# Patient Record
Sex: MALE | Race: WHITE | NOT HISPANIC OR LATINO | Employment: OTHER | URBAN - METROPOLITAN AREA
[De-identification: names, ages, dates, MRNs, and addresses within clinical notes are randomized per-mention and may not be internally consistent; named-entity substitution may affect disease eponyms.]

---

## 2023-04-11 ENCOUNTER — HOSPITAL ENCOUNTER (OUTPATIENT)
Dept: RADIOLOGY | Facility: MEDICAL CENTER | Age: 73
End: 2023-04-11
Attending: SURGERY
Payer: MEDICARE

## 2023-04-11 DIAGNOSIS — E21.0 PRIMARY HYPERPARATHYROIDISM (HCC): ICD-10-CM

## 2023-04-11 PROCEDURE — 76536 US EXAM OF HEAD AND NECK: CPT

## 2023-04-11 PROCEDURE — 77080 DXA BONE DENSITY AXIAL: CPT

## 2023-04-24 ENCOUNTER — APPOINTMENT (OUTPATIENT)
Dept: ADMISSIONS | Facility: MEDICAL CENTER | Age: 73
End: 2023-04-24
Attending: SURGERY
Payer: MEDICARE

## 2023-05-01 ENCOUNTER — PRE-ADMISSION TESTING (OUTPATIENT)
Dept: ADMISSIONS | Facility: MEDICAL CENTER | Age: 73
End: 2023-05-01
Attending: SURGERY
Payer: MEDICARE

## 2023-05-01 DIAGNOSIS — Z01.812 PRE-PROCEDURAL LABORATORY EXAMINATION: ICD-10-CM

## 2023-05-01 DIAGNOSIS — Z01.810 PRE-PROCEDURAL CARDIOVASCULAR EXAMINATION: ICD-10-CM

## 2023-05-01 LAB
ANION GAP SERPL CALC-SCNC: 12 MMOL/L (ref 7–16)
BUN SERPL-MCNC: 34 MG/DL (ref 8–22)
CALCIUM SERPL-MCNC: 10.4 MG/DL (ref 8.5–10.5)
CHLORIDE SERPL-SCNC: 102 MMOL/L (ref 96–112)
CO2 SERPL-SCNC: 19 MMOL/L (ref 20–33)
CREAT SERPL-MCNC: 1.35 MG/DL (ref 0.5–1.4)
EKG IMPRESSION: NORMAL
ERYTHROCYTE [DISTWIDTH] IN BLOOD BY AUTOMATED COUNT: 43.1 FL (ref 35.9–50)
GFR SERPLBLD CREATININE-BSD FMLA CKD-EPI: 55 ML/MIN/1.73 M 2
GLUCOSE SERPL-MCNC: 92 MG/DL (ref 65–99)
HCT VFR BLD AUTO: 47.9 % (ref 42–52)
HGB BLD-MCNC: 16.1 G/DL (ref 14–18)
MCH RBC QN AUTO: 31.1 PG (ref 27–33)
MCHC RBC AUTO-ENTMCNC: 33.6 G/DL (ref 33.7–35.3)
MCV RBC AUTO: 92.5 FL (ref 81.4–97.8)
PLATELET # BLD AUTO: 242 K/UL (ref 164–446)
PMV BLD AUTO: 10 FL (ref 9–12.9)
POTASSIUM SERPL-SCNC: 4.2 MMOL/L (ref 3.6–5.5)
RBC # BLD AUTO: 5.18 M/UL (ref 4.7–6.1)
SODIUM SERPL-SCNC: 133 MMOL/L (ref 135–145)
WBC # BLD AUTO: 8.4 K/UL (ref 4.8–10.8)

## 2023-05-01 PROCEDURE — 85027 COMPLETE CBC AUTOMATED: CPT

## 2023-05-01 PROCEDURE — 80048 BASIC METABOLIC PNL TOTAL CA: CPT

## 2023-05-01 PROCEDURE — 93010 ELECTROCARDIOGRAM REPORT: CPT | Performed by: INTERNAL MEDICINE

## 2023-05-01 PROCEDURE — 36415 COLL VENOUS BLD VENIPUNCTURE: CPT

## 2023-05-01 PROCEDURE — 93005 ELECTROCARDIOGRAM TRACING: CPT

## 2023-05-01 RX ORDER — METOPROLOL SUCCINATE 50 MG/1
TABLET, EXTENDED RELEASE ORAL
COMMUNITY
Start: 2023-02-05

## 2023-05-01 RX ORDER — LEVOTHYROXINE SODIUM 88 UG/1
TABLET ORAL
COMMUNITY
Start: 2023-02-05

## 2023-05-01 RX ORDER — LISINOPRIL AND HYDROCHLOROTHIAZIDE 25; 20 MG/1; MG/1
TABLET ORAL
COMMUNITY
Start: 2023-02-05

## 2023-05-01 RX ORDER — ATORVASTATIN CALCIUM 20 MG/1
TABLET, FILM COATED ORAL
COMMUNITY
Start: 2023-02-05

## 2023-05-03 ENCOUNTER — ANESTHESIA (OUTPATIENT)
Dept: SURGERY | Facility: MEDICAL CENTER | Age: 73
End: 2023-05-03
Payer: MEDICARE

## 2023-05-03 ENCOUNTER — ANESTHESIA EVENT (OUTPATIENT)
Dept: SURGERY | Facility: MEDICAL CENTER | Age: 73
End: 2023-05-03
Payer: MEDICARE

## 2023-05-03 ENCOUNTER — HOSPITAL ENCOUNTER (OUTPATIENT)
Facility: MEDICAL CENTER | Age: 73
End: 2023-05-03
Attending: SURGERY | Admitting: SURGERY
Payer: MEDICARE

## 2023-05-03 VITALS
RESPIRATION RATE: 18 BRPM | OXYGEN SATURATION: 91 % | HEIGHT: 70 IN | WEIGHT: 232.37 LBS | TEMPERATURE: 97.1 F | HEART RATE: 75 BPM | SYSTOLIC BLOOD PRESSURE: 128 MMHG | BODY MASS INDEX: 33.27 KG/M2 | DIASTOLIC BLOOD PRESSURE: 56 MMHG

## 2023-05-03 LAB — PATHOLOGY CONSULT NOTE: NORMAL

## 2023-05-03 PROCEDURE — 36620 INSERTION CATHETER ARTERY: CPT | Performed by: ANESTHESIOLOGY

## 2023-05-03 PROCEDURE — 88305 TISSUE EXAM BY PATHOLOGIST: CPT

## 2023-05-03 PROCEDURE — 700111 HCHG RX REV CODE 636 W/ 250 OVERRIDE (IP): Performed by: ANESTHESIOLOGY

## 2023-05-03 PROCEDURE — C1751 CATH, INF, PER/CENT/MIDLINE: HCPCS | Performed by: SURGERY

## 2023-05-03 PROCEDURE — 88331 PATH CONSLTJ SURG 1 BLK 1SPC: CPT

## 2023-05-03 PROCEDURE — 00320 ANES ALL PX NECK NOS 1YR/>: CPT | Performed by: ANESTHESIOLOGY

## 2023-05-03 PROCEDURE — A9270 NON-COVERED ITEM OR SERVICE: HCPCS | Performed by: ANESTHESIOLOGY

## 2023-05-03 PROCEDURE — 160048 HCHG OR STATISTICAL LEVEL 1-5: Performed by: SURGERY

## 2023-05-03 PROCEDURE — 700101 HCHG RX REV CODE 250: Performed by: ANESTHESIOLOGY

## 2023-05-03 PROCEDURE — 160046 HCHG PACU - 1ST 60 MINS PHASE II: Performed by: SURGERY

## 2023-05-03 PROCEDURE — 700102 HCHG RX REV CODE 250 W/ 637 OVERRIDE(OP): Performed by: ANESTHESIOLOGY

## 2023-05-03 PROCEDURE — 160002 HCHG RECOVERY MINUTES (STAT): Performed by: SURGERY

## 2023-05-03 PROCEDURE — 700105 HCHG RX REV CODE 258: Performed by: SURGERY

## 2023-05-03 PROCEDURE — 160009 HCHG ANES TIME/MIN: Performed by: SURGERY

## 2023-05-03 PROCEDURE — 160035 HCHG PACU - 1ST 60 MINS PHASE I: Performed by: SURGERY

## 2023-05-03 PROCEDURE — 83970 ASSAY OF PARATHORMONE: CPT | Mod: 91

## 2023-05-03 PROCEDURE — 160041 HCHG SURGERY MINUTES - EA ADDL 1 MIN LEVEL 4: Performed by: SURGERY

## 2023-05-03 PROCEDURE — 160029 HCHG SURGERY MINUTES - 1ST 30 MINS LEVEL 4: Performed by: SURGERY

## 2023-05-03 PROCEDURE — 160025 RECOVERY II MINUTES (STATS): Performed by: SURGERY

## 2023-05-03 PROCEDURE — 99100 ANES PT EXTEME AGE<1 YR&>70: CPT | Performed by: ANESTHESIOLOGY

## 2023-05-03 RX ORDER — SODIUM CHLORIDE, SODIUM LACTATE, POTASSIUM CHLORIDE, CALCIUM CHLORIDE 600; 310; 30; 20 MG/100ML; MG/100ML; MG/100ML; MG/100ML
INJECTION, SOLUTION INTRAVENOUS CONTINUOUS
Status: DISCONTINUED | OUTPATIENT
Start: 2023-05-03 | End: 2023-05-03 | Stop reason: HOSPADM

## 2023-05-03 RX ORDER — HYDROMORPHONE HYDROCHLORIDE 1 MG/ML
0.4 INJECTION, SOLUTION INTRAMUSCULAR; INTRAVENOUS; SUBCUTANEOUS
Status: DISCONTINUED | OUTPATIENT
Start: 2023-05-03 | End: 2023-05-03 | Stop reason: HOSPADM

## 2023-05-03 RX ORDER — ROCURONIUM BROMIDE 10 MG/ML
INJECTION, SOLUTION INTRAVENOUS PRN
Status: DISCONTINUED | OUTPATIENT
Start: 2023-05-03 | End: 2023-05-03 | Stop reason: SURG

## 2023-05-03 RX ORDER — HYDROMORPHONE HYDROCHLORIDE 1 MG/ML
0.1 INJECTION, SOLUTION INTRAMUSCULAR; INTRAVENOUS; SUBCUTANEOUS
Status: DISCONTINUED | OUTPATIENT
Start: 2023-05-03 | End: 2023-05-03 | Stop reason: HOSPADM

## 2023-05-03 RX ORDER — DEXAMETHASONE SODIUM PHOSPHATE 4 MG/ML
INJECTION, SOLUTION INTRA-ARTICULAR; INTRALESIONAL; INTRAMUSCULAR; INTRAVENOUS; SOFT TISSUE PRN
Status: DISCONTINUED | OUTPATIENT
Start: 2023-05-03 | End: 2023-05-03 | Stop reason: SURG

## 2023-05-03 RX ORDER — MIDAZOLAM HYDROCHLORIDE 1 MG/ML
INJECTION INTRAMUSCULAR; INTRAVENOUS PRN
Status: DISCONTINUED | OUTPATIENT
Start: 2023-05-03 | End: 2023-05-03 | Stop reason: SURG

## 2023-05-03 RX ORDER — OXYCODONE HCL 5 MG/5 ML
5 SOLUTION, ORAL ORAL
Status: DISCONTINUED | OUTPATIENT
Start: 2023-05-03 | End: 2023-05-03 | Stop reason: HOSPADM

## 2023-05-03 RX ORDER — DIPHENHYDRAMINE HYDROCHLORIDE 50 MG/ML
12.5 INJECTION INTRAMUSCULAR; INTRAVENOUS
Status: DISCONTINUED | OUTPATIENT
Start: 2023-05-03 | End: 2023-05-03 | Stop reason: HOSPADM

## 2023-05-03 RX ORDER — ONDANSETRON 2 MG/ML
4 INJECTION INTRAMUSCULAR; INTRAVENOUS
Status: DISCONTINUED | OUTPATIENT
Start: 2023-05-03 | End: 2023-05-03 | Stop reason: HOSPADM

## 2023-05-03 RX ORDER — EPHEDRINE SULFATE 50 MG/ML
INJECTION, SOLUTION INTRAVENOUS PRN
Status: DISCONTINUED | OUTPATIENT
Start: 2023-05-03 | End: 2023-05-03 | Stop reason: SURG

## 2023-05-03 RX ORDER — ACETAMINOPHEN 500 MG
1000 TABLET ORAL ONCE
Status: COMPLETED | OUTPATIENT
Start: 2023-05-03 | End: 2023-05-03

## 2023-05-03 RX ORDER — HALOPERIDOL 5 MG/ML
1 INJECTION INTRAMUSCULAR
Status: DISCONTINUED | OUTPATIENT
Start: 2023-05-03 | End: 2023-05-03 | Stop reason: HOSPADM

## 2023-05-03 RX ORDER — HYDROMORPHONE HYDROCHLORIDE 1 MG/ML
0.2 INJECTION, SOLUTION INTRAMUSCULAR; INTRAVENOUS; SUBCUTANEOUS
Status: DISCONTINUED | OUTPATIENT
Start: 2023-05-03 | End: 2023-05-03 | Stop reason: HOSPADM

## 2023-05-03 RX ORDER — LABETALOL HYDROCHLORIDE 5 MG/ML
5 INJECTION, SOLUTION INTRAVENOUS
Status: DISCONTINUED | OUTPATIENT
Start: 2023-05-03 | End: 2023-05-03 | Stop reason: HOSPADM

## 2023-05-03 RX ORDER — HYDRALAZINE HYDROCHLORIDE 20 MG/ML
5 INJECTION INTRAMUSCULAR; INTRAVENOUS
Status: DISCONTINUED | OUTPATIENT
Start: 2023-05-03 | End: 2023-05-03 | Stop reason: HOSPADM

## 2023-05-03 RX ORDER — EPHEDRINE SULFATE 50 MG/ML
5 INJECTION, SOLUTION INTRAVENOUS
Status: DISCONTINUED | OUTPATIENT
Start: 2023-05-03 | End: 2023-05-03 | Stop reason: HOSPADM

## 2023-05-03 RX ORDER — PHENYLEPHRINE HYDROCHLORIDE 10 MG/ML
INJECTION, SOLUTION INTRAMUSCULAR; INTRAVENOUS; SUBCUTANEOUS PRN
Status: DISCONTINUED | OUTPATIENT
Start: 2023-05-03 | End: 2023-05-03 | Stop reason: SURG

## 2023-05-03 RX ORDER — LIDOCAINE HYDROCHLORIDE 20 MG/ML
INJECTION, SOLUTION EPIDURAL; INFILTRATION; INTRACAUDAL; PERINEURAL PRN
Status: DISCONTINUED | OUTPATIENT
Start: 2023-05-03 | End: 2023-05-03 | Stop reason: SURG

## 2023-05-03 RX ORDER — OXYCODONE HCL 5 MG/5 ML
10 SOLUTION, ORAL ORAL
Status: DISCONTINUED | OUTPATIENT
Start: 2023-05-03 | End: 2023-05-03 | Stop reason: HOSPADM

## 2023-05-03 RX ADMIN — PROPOFOL 120 MG: 10 INJECTION, EMULSION INTRAVENOUS at 13:04

## 2023-05-03 RX ADMIN — EPHEDRINE SULFATE 10 MG: 50 INJECTION, SOLUTION INTRAVENOUS at 13:30

## 2023-05-03 RX ADMIN — PHENYLEPHRINE HYDROCHLORIDE 100 MCG: 10 INJECTION INTRAVENOUS at 13:53

## 2023-05-03 RX ADMIN — EPHEDRINE SULFATE 10 MG: 50 INJECTION, SOLUTION INTRAVENOUS at 13:12

## 2023-05-03 RX ADMIN — MIDAZOLAM HYDROCHLORIDE 2 MG: 1 INJECTION, SOLUTION INTRAMUSCULAR; INTRAVENOUS at 12:59

## 2023-05-03 RX ADMIN — ROCURONIUM BROMIDE 30 MG: 10 INJECTION, SOLUTION INTRAVENOUS at 13:04

## 2023-05-03 RX ADMIN — EPHEDRINE SULFATE 5 MG: 50 INJECTION, SOLUTION INTRAVENOUS at 13:43

## 2023-05-03 RX ADMIN — DEXAMETHASONE SODIUM PHOSPHATE 8 MG: 4 INJECTION, SOLUTION INTRA-ARTICULAR; INTRALESIONAL; INTRAMUSCULAR; INTRAVENOUS; SOFT TISSUE at 13:15

## 2023-05-03 RX ADMIN — SUGAMMADEX 200 MG: 100 INJECTION, SOLUTION INTRAVENOUS at 13:21

## 2023-05-03 RX ADMIN — FENTANYL CITRATE 100 MCG: 50 INJECTION, SOLUTION INTRAMUSCULAR; INTRAVENOUS at 13:24

## 2023-05-03 RX ADMIN — PHENYLEPHRINE HYDROCHLORIDE 100 MCG: 10 INJECTION INTRAVENOUS at 13:43

## 2023-05-03 RX ADMIN — LIDOCAINE HYDROCHLORIDE 100 MG: 20 INJECTION, SOLUTION EPIDURAL; INFILTRATION; INTRACAUDAL at 13:03

## 2023-05-03 RX ADMIN — SODIUM CHLORIDE, POTASSIUM CHLORIDE, SODIUM LACTATE AND CALCIUM CHLORIDE: 600; 310; 30; 20 INJECTION, SOLUTION INTRAVENOUS at 11:07

## 2023-05-03 RX ADMIN — PHENYLEPHRINE HYDROCHLORIDE 100 MCG: 10 INJECTION INTRAVENOUS at 13:07

## 2023-05-03 RX ADMIN — PHENYLEPHRINE HYDROCHLORIDE 100 MCG: 10 INJECTION INTRAVENOUS at 13:26

## 2023-05-03 RX ADMIN — FENTANYL CITRATE 100 MCG: 50 INJECTION, SOLUTION INTRAMUSCULAR; INTRAVENOUS at 12:59

## 2023-05-03 RX ADMIN — ACETAMINOPHEN 1000 MG: 500 TABLET, FILM COATED ORAL at 11:06

## 2023-05-03 RX ADMIN — EPHEDRINE SULFATE 5 MG: 50 INJECTION, SOLUTION INTRAVENOUS at 13:53

## 2023-05-03 ASSESSMENT — PAIN DESCRIPTION - PAIN TYPE
TYPE: SURGICAL PAIN
TYPE: SURGICAL PAIN

## 2023-05-03 NOTE — ANESTHESIA TIME REPORT
Anesthesia Start and Stop Event Times     Date Time Event    5/3/2023 1230 Ready for Procedure     1252 Anesthesia Start     1412 Anesthesia Stop        Responsible Staff  05/03/23    Name Role Begin End    Saul Hooper M.D. Anesth 1252 1412        Overtime Reason:  no overtime (within assigned shift)    Comments:

## 2023-05-03 NOTE — DISCHARGE INSTRUCTIONS
Post-Operative Discharge Instructions for Parathyroidectomy      ACTIVITY:    Most patients are able to return to a full-time work schedule in 1-2 weeks; however this may vary according to your job. It may take longer to return to heavy physical or other demanding work, or shorter if you are feeling well.      Do NOT drive a car until you are able to turn the neck side to side, which may take 1-2 weeks.       Do NOT drive while you are taking pain medicines.      DIET:    You may have temporary throat discomfort or difficulty swallowing. This is due to the surgery around your larynx (voice box) and esophagus (swallowing tube). These symptoms will gradually improve over the course of several weeks.       Drink and eat foods that can be swallowed easily, e.g. juice, soup, gelatin, applesauce, scrambled eggs or mashed potatoes.       You may be able to return to your usual diet in a couple of days.      INCISION CARE:    You may remove the outer dressing 48 hours after surgery.      You may shower 48 hours after surgery but please do not swim or soak in a tub for at least 2 weeks. After you finish showering, just pat your incision dry. If it is draining clear fluid, you can cover it with a dry dressing (such as gauze). Do NOT scrub with soap or washcloth for the first 10 days.       Mild swelling at the incision site will go away in 4-6 weeks. The pink line will slowly fade to white during the next 6-12 months.       Use a sunscreen (SPF#30 or higher) or wear a scarf for protection if in the sun for the first 6 months to a year as the sun can darken your scar.       You may begin to use a hypoallergenic moisturizing cream (no vitamin E, Mederma, or other “scar” creams) along the incision after 2 weeks.      COMMON PROBLEMS:    Numbness of the skin under the chin or above the incision is normal and should go away in a few weeks.       You may feel a lump or pressure in your throat sensation when swallowing for a few  weeks.       Your incision may feel itchy while it heals. Avoid rubbing or scratching if possible.       You may feel neck stiffness, tightness, a pulling feeling, mild aching chest discomfort, headache, ear pain or congestion. Take a mild pain medicine such as Tylenol or Advil. Put heat on the area using a hot water bottle, heating pad or warm shower.       Some people prefer to sleep with an extra pillow for the first few days after the surgery, this helps keep swelling around your incision to a minimum.       Your voice may be hoarse or weak. Pitch or tone may change. You may have difficulty singing. This usually goes back to normal over 6 weeks to 6 months.       After surgery, you may notice a change in your mood, emotional ups and downs, depression, irritability or fatigue and weakness. These changes will get better as time passes.       You do not need to be at bed rest, being active is normally well tolerated within reason.      MEDICATIONS:    Take 2000 mg of TUMS over-the-counter after your surgery (two tablets). If you begin experiencing symptoms of low calcium such as numbness or tingling in your fingertips or around your mouth, you may increase the amount of TUMS you take up to 10 tablets per day. If symptoms persist on 10 TUMS a day, call my office to inform me.      Please note that it is common for the calcium level to be low following removal of the parathyroid, and you may experience numbness or tingling, which is a sign of low calcium. If this happens, it should improve when you take your calcium supplements. If it does not, please call your doctor.       Please resume your pre-hospital medications. You should follow-up with your primary care physician regarding new prescriptions and refills.       We will supply you with a prescription for a mild narcotic pain medication. You are not required to take it. If you do take it, please do not drive or drink alcohol as these in combination may make you  drowsy. Most patients do not need strong pain medicine by the time you leave the hospital. You can take Tylenol (acetaminophen) or ibuprofen (e.g. Advil) as needed.       If you are taking supplemental calcium or narcotics, you may also want to take a stool softener, such as over-the-counter Colace or Senna, to avoid constipation. Stay hydrated by drinking some extra water.      LABORATORY DRAW:  You have been provided with a prescription to have you calcium levels drawn prior to your follow up visit. The order is at the laboratory that you designated at as your lab of choice at the office. Please have this drawn ~2 days prior to your follow-up visit.     CALL YOUR DOCTOR IF:    Call your doctor or go to the Emergency Room if you have fever (temperature greater than 100.5), chills, lightheadedness, shortness of breath, difficulty breathing, nausea, vomiting, numbness or tingling in your fingers, hands, or mouth, muscle spasms, or if you notice signs of wound infection (redness, tenderness, or drainage from the incision). Please also call or go to the Emergency Room if you have any other urgent concerns.      FOLLOW-UP:   With Dr. Perez in one week, call to schedule, [phone].     Melonie Perez M.D.  [facility]     [address]  [place] [address]           If any questions arise, call your provider.  If your provider is not available, please feel free to call the Surgical Center at [phone].    What to Expect Post Anesthesia    Rest and take it easy for the first 24 hours.  A responsible adult is recommended to remain with you during that time.  It is normal to feel sleepy.  We encourage you to not do anything that requires balance, judgment or coordination.    FOR 24 HOURS DO NOT:  Drive, operate machinery or run household appliances.  Drink beer or alcoholic beverages.  Make important decisions or sign legal documents.    MILD FLU-LIKE SYMPTOMS ARE NORMAL.  YOU MAY EXPERIENCE  GENERALIZED MUSCLE ACHES, THROAT IRRITATION, HEADACHE AND/OR SOME NAUSEA.    Last pain medication: Tylenol 1000 mg given at 11:00AM - may take again at/after 5:00PM    Prescriptions: NONE - CALL DR MARTINEZ IF PAIN NOT MANAGED BY OVER THE COUNTER MEDICATIONS

## 2023-05-03 NOTE — OR NURSING
1456 Report received from RN. Pt arrived to bay 20 and ambulated to recliner with assistance.     1509 Discharge instructions reviewed with family at bedside, verbalize understanding and all questions answered.     1518 IV and ID bands removed & patient changed into own clothing with standby assist.     1520 Declined wheelchair escort; ambulated to car, accompanied by spouse and RN. All personal belongings and discharge instructions with patient.

## 2023-05-03 NOTE — ANESTHESIA PROCEDURE NOTES
Airway    Date/Time: 5/3/2023 1:04 PM  Performed by: Saul Hooper M.D.  Authorized by: Saul Hooper M.D.     Location:  OR  Urgency:  Elective  Difficult Airway: No    Indications for Airway Management:  Anesthesia      Spontaneous Ventilation: absent    Sedation Level:  Deep  Preoxygenated: Yes    Patient Position:  Sniffing  Mask Difficulty Assessment:  2 - vent by mask + OA or adjuvant +/- NMBA  Final Airway Type:  Endotracheal airway  Final Endotracheal Airway:  ETT and NIM tube  Cuffed: Yes    Technique Used for Successful ETT Placement:  Direct laryngoscopy    Insertion Site:  Oral  Blade Type:  Elías  Laryngoscope Blade/Videolaryngoscope Blade Size:  3  ETT Size (mm):  7.0  Measured from:  Teeth  ETT to Teeth (cm):  23  Placement Verified by: auscultation and capnometry    Cormack-Lehane Classification:  Grade IIb - view of arytenoids or posterior of glottis only  Number of Attempts at Approach:  1   Atraumatic DLx1

## 2023-05-03 NOTE — PROGRESS NOTES
Post-Operative Discharge Instructions for Parathyroidectomy     ACTIVITY:    Most patients are able to return to a full-time work schedule in 1-2 weeks; however this may vary according to your job. It may take longer to return to heavy physical or other demanding work, or shorter if you are feeling well.     Do NOT drive a car until you are able to turn the neck side to side, which may take 1-2 weeks.      Do NOT drive while you are taking pain medicines.     DIET:    You may have temporary throat discomfort or difficulty swallowing. This is due to the surgery around your larynx (voice box) and esophagus (swallowing tube). These symptoms will gradually improve over the course of several weeks.      Drink and eat foods that can be swallowed easily, e.g. juice, soup, gelatin, applesauce, scrambled eggs or mashed potatoes.      You may be able to return to your usual diet in a couple of days.     INCISION CARE:    You may remove the outer dressing 48 hours after surgery.     You may shower 48 hours after surgery but please do not swim or soak in a tub for at least 2 weeks. After you finish showering, just pat your incision dry. If it is draining clear fluid, you can cover it with a dry dressing (such as gauze). Do NOT scrub with soap or washcloth for the first 10 days.      Mild swelling at the incision site will go away in 4-6 weeks. The pink line will slowly fade to white during the next 6-12 months.      Use a sunscreen (SPF#30 or higher) or wear a scarf for protection if in the sun for the first 6 months to a year as the sun can darken your scar.      You may begin to use a hypoallergenic moisturizing cream (no vitamin E, Mederma, or other “scar” creams) along the incision after 2 weeks.     COMMON PROBLEMS:    Numbness of the skin under the chin or above the incision is normal and should go away in a few weeks.      You may feel a lump or pressure in your throat sensation when swallowing for a few weeks.      Your  incision may feel itchy while it heals. Avoid rubbing or scratching if possible.      You may feel neck stiffness, tightness, a pulling feeling, mild aching chest discomfort, headache, ear pain or congestion. Take a mild pain medicine such as Tylenol or Advil. Put heat on the area using a hot water bottle, heating pad or warm shower.      Some people prefer to sleep with an extra pillow for the first few days after the surgery, this helps keep swelling around your incision to a minimum.      Your voice may be hoarse or weak. Pitch or tone may change. You may have difficulty singing. This usually goes back to normal over 6 weeks to 6 months.      After surgery, you may notice a change in your mood, emotional ups and downs, depression, irritability or fatigue and weakness. These changes will get better as time passes.      You do not need to be at bed rest, being active is normally well tolerated within reason.     MEDICATIONS:    Take 2000 mg of TUMS over-the-counter after your surgery (two tablets). If you begin experiencing symptoms of low calcium such as numbness or tingling in your fingertips or around your mouth, you may increase the amount of TUMS you take up to 10 tablets per day. If symptoms persist on 10 TUMS a day, call my office to inform me.     Please note that it is common for the calcium level to be low following removal of the parathyroid, and you may experience numbness or tingling, which is a sign of low calcium. If this happens, it should improve when you take your calcium supplements. If it does not, please call your doctor.      Please resume your pre-hospital medications. You should follow-up with your primary care physician regarding new prescriptions and refills.      We will supply you with a prescription for a mild narcotic pain medication. You are not required to take it. If you do take it, please do not drive or drink alcohol as these in combination may make you drowsy. Most patients do  not need strong pain medicine by the time you leave the hospital. You can take Tylenol (acetaminophen) or ibuprofen (e.g. Advil) as needed.      If you are taking supplemental calcium or narcotics, you may also want to take a stool softener, such as over-the-counter Colace or Senna, to avoid constipation. Stay hydrated by drinking some extra water.     LABORATORY DRAW:  You have been provided with a prescription to have you calcium levels drawn prior to your follow up visit. The order is at the laboratory that you designated at as your lab of choice at the office. Please have this drawn ~2 days prior to your follow-up visit.    CALL YOUR DOCTOR IF:    Call your doctor or go to the Emergency Room if you have fever (temperature greater than 100.5), chills, lightheadedness, shortness of breath, difficulty breathing, nausea, vomiting, numbness or tingling in your fingers, hands, or mouth, muscle spasms, or if you notice signs of wound infection (redness, tenderness, or drainage from the incision). Please also call or go to the Emergency Room if you have any other urgent concerns.     FOLLOW-UP:   With Dr. Perez in one week, call to schedule, [phone].    Melonie Perez M.D.  [facility]    [address]  [place] [address]

## 2023-05-03 NOTE — ANESTHESIA PROCEDURE NOTES
Arterial Line  Performed by: Saul Hooper M.D.  Authorized by: Saul Hooper M.D.     Start Time:  5/3/2023 3:05 AM  End Time:  5/3/2023 1:07 PM  Localization: surface landmarks    Patient Location:  OR  Indication: continuous blood pressure monitoring        Catheter Size:  20 G  Seldinger Technique?: Yes    Laterality:  Left  Site:  Radial artery  Line Secured:  Antimicrobial disc, tape and transparent dressing  Events: patient tolerated procedure well with no complications     Alcohol prep, arrow catheter.  Secured with tegaderm and tape.

## 2023-05-03 NOTE — ANESTHESIA POSTPROCEDURE EVALUATION
Patient: George Durham Jr.    Procedure Summary     Date: 05/03/23 Room / Location: [facility] 23 / SURGERY SAME DAY [facility]    Anesthesia Start: 1252 Anesthesia Stop: 1412    Procedure: PARATHYROIDECTOMY (Neck) Diagnosis: (PRIMARY HYPERPARATHYROIDISM)    Surgeons: Melonie Perez M.D. Responsible Provider: Saul Hooper M.D.    Anesthesia Type: general ASA Status: 2          Final Anesthesia Type: general  Last vitals  BP   Blood Pressure : 128/56    Temp   36.2 °C (97.1 °F)    Pulse   75   Resp   18    SpO2   91 %      Anesthesia Post Evaluation    Patient location during evaluation: PACU  Patient participation: complete - patient participated  Level of consciousness: awake and alert    Airway patency: patent  Anesthetic complications: no  Cardiovascular status: hemodynamically stable  Respiratory status: acceptable  Hydration status: euvolemic    PONV: none          No notable events documented.     Nurse Pain Score: 2 (NPRS)

## 2023-05-03 NOTE — OR NURSING
1409: Pt arrived to PACU. Connected to monitor. Report received from EWA Jones and Dr Hooper. 8L O2 via mask. Gauze and tegaderm dressing to neck noted clean dry and intact. No signs of hematoma.    1420: pt arousable. Denies pain or nausea. Weaned to 4L O2 via mask.     1430: weaned to 2L O2 via NC. Tolerating sips of water.     1444: report to EWA Baugh. Phase 1 complete. Pt to transfer to phase 2. Weaned to room air.    1450: left arterial line discontinued. Gauze and tegaderm applied after manual pressure held for 5 minutes.     1456: pt in phase 2 bay #20. Handoff to EWA Baugh. Wife at bedside.

## 2023-05-03 NOTE — OP REPORT
Operative Report     Date: 5/3/2023    Surgeon: Melonie Perez M.D.     Assistant: DOROTHY Stoll    My assistant, DOROTHY Stoll, was medically necessary for this procedure. He placed the precordial electrodes were placed by the surgical assistant, who then monitored the recurrent laryngeal nerve throughout the procedure, as well as retracted the tissues to aid in my visualization of the parathyroid tissue, and assisted with wound closure.    Anesthesiologist: Sandie NOLAN    Pre-operative Diagnosis: E 21.0 primary hyperparathyroidism     Post-operative Diagnosis: same, left inferior parathyroid adenoma     Procedure:   1. left minimally invasive parathyroid exploration with intraoperative PTH monitoring (47735 Parathyroidectomy or exploration of parathyroid)  2. unilateral recurrent laryngeal nerve monitoring     Findings: Enlarged left inferior parathyroid adenoma. Intraoperative PTH monitoring was performed with levels drawn at appropriate intervals. There was a 82% drop from the highest level, with the final level being 20 picograms/deciliter.     Procedure in detail: The patient was identified in the pre-operative holding area and brought to the operating room. Correct side and site were identified. GETA was smoothly induced. The patient was prepped and draped in the usual sterile fashion.     With the patient in the supine position, the head of the bed slightly elevated, the neck slightly extended, and the patient intubated with a NIM endotracheal tube, the precordial electrodes were placed by the surgical assistant, who then monitored the recurrent laryngeal nerve throughout the procedure.     The neck was prepared with betadiene and draped in the usual fashion. The neck was entered through a short lower transverse cervical incision and carried down through the platysma. Subplatysmal flaps were dissected superiorly and inferiorly down to the sternal notch. The midline cervical fascia was incised down to  the capsule of the thyroid.     The strap muscles were mobilized off the left thyroid lobe, which was diminutive and friable, presumably given the patient's history of radioactive iodine treatment. With careful dissection we identified an enlarged appearing left inferior gland. The vascular pedicles were divided with the Ligasure device. The gland was sent for immediate pathologic exam, which revealed a hypercellular enlarged parathyroid gland.     Intraoperative PTH monitoring was performed with levels drawn at appropriate intervals. There was a 82% drop from the highest level, with the final level being 20 picograms/deciliter.     The integrity of the left laryngeal nerve was documented. Hemoblast was placed in the left side of the neck. The midline cervical fascia was reapproximated with running 3-0 Vicryl. Platysma was reapproximated with interrupted 3-0 Vicryl. The skin was closed with running monocryl.     The patient was awakened from general anesthetic, and was taken to the recovery room in stable condition.     Sponge and needle counts were correct at the end of the case.     Specimen: left inferior parathyroid adenoma     EBL: minimal     Dispo: stable, extubated, to PACU     Melonie Perez M.D.   [facility]   [phone]

## 2023-05-03 NOTE — ANESTHESIA PREPROCEDURE EVALUATION
Case: [id number] Date/Time: 05/03/23 1315    Procedure: PARATHYROIDECTOMY (Neck)    Anesthesia type: General    Pre-op diagnosis: PRIMARY HYPERPARATHYROIDISM    Location: [facility] ROOM 23 / SURGERY SAME DAY [facility]    Surgeons: Melonie Perez M.D.          Relevant Problems   No relevant active problems   HTN  DLD  hypothyroid    Physical Exam    Airway   Mallampati: II  TM distance: >3 FB  Neck ROM: full       Cardiovascular - normal exam  Rhythm: regular  Rate: normal  (-) murmur     Dental - normal exam           Pulmonary - normal exam  Breath sounds clear to auscultation     Abdominal    Neurological - normal exam                 Anesthesia Plan    ASA 2       Plan - general       Airway plan will be ETT    (A-line)      Induction: intravenous    Postoperative Plan: Postoperative administration of opioids is intended.    Pertinent diagnostic labs and testing reviewed    Informed Consent:    Anesthetic plan and risks discussed with patient.    Use of blood products discussed with: patient whom consented to blood products.

## 2023-05-04 ENCOUNTER — RESEARCH ENCOUNTER (OUTPATIENT)
Dept: CARDIOLOGY | Facility: MEDICAL CENTER | Age: 73
End: 2023-05-04
Payer: MEDICARE

## 2023-05-04 NOTE — RESEARCH NOTE
Name: George Durham Jr.   MRN: [id number]  Participant ID:  [id number]  : 1950  Visit Date/Time: 5/3/2023 10:31 AM  Who is present: Leatha Capellan    Study:    VITROS RDI INTACT PTH II [id number] - VITROS Intact PTH II [id number]   Status Consented/Enrolled (5/3/2023)   Active Start Date 5/3/23   Participant ID [id number]   Coordinator Leatha Capellan; Oralia Curtis    Melonie Perez M.D.       Screening/Consent note:    Participation in the Vitros clinical trial was discussed with George ismael. All aspects of the study purpose and procedures were explained.  He was given ample time to review the consent and all questions were answered to his satisfaction. Patient aware that the clinical trial is voluntary and he may withdraw consent at any time without affecting the level of care they receive. Subject signed consent without coercion and undue influence and was given a copy of the signed consent. No study-related procedures took place prior to consenting and all assessments were conducted per protocol. Labs processed per protocol. No AE/ROSE to report.

## 2023-05-17 LAB
COLLECT TME BLD: 1314 HH:MM
COLLECT TME BLD: 1329 HH:MM
COLLECT TME BLD: 1335 HH:MM
COLLECT TME BLD: 1341 HH:MM
COLLECT TME BLD: 1347 HH:MM
PTH-INTACT IO % DIF SERPL: 70 %
PTH-INTACT IO % DIF SERPL: 78 %
PTH-INTACT IO % DIF SERPL: 82 %
PTH-INTACT SP1 SERPL-MCNC: 113 PG/ML (ref 14–72)
PTH-INTACT SP2 SERPL-MCNC: 73.6 PG/ML
PTH-INTACT SP3 SERPL-MCNC: 33.5 PG/ML
PTH-INTACT SP4 SERPL-MCNC: 25.3 PG/ML
PTH-INTACT SP5 SERPL-MCNC: 20.5 PG/ML

## (undated) DEVICE — SLEEVE VASO CALF MED - (10PR/CA)

## (undated) DEVICE — MASK OXYGEN VNYL ADLT MED CONC WITH 7 FOOT TUBING  - (50EA/CA)

## (undated) DEVICE — SUTURE 3-0 VICRYL PLUS SH - 8X 18 INCH (12/BX)

## (undated) DEVICE — SPONGE GAUZESTER 4 X 4 4PLY - (128PK/CA)

## (undated) DEVICE — DRAPE LG. APERTURE 33 X 51" - (10EA/BX)"

## (undated) DEVICE — CLIP SM INTNL HRZN TI ESCP LGT - (24EA/PK 25PK/BX)

## (undated) DEVICE — PROBE TAPERED TIP MONOPOLAR HUMMINGBIRD (5EA/BX)

## (undated) DEVICE — TUBE EMG NIM TRIVANTAGE 7MM (3EA/PK)

## (undated) DEVICE — CLIP MED INTNL HRZN TI ESCP - (25/BX)

## (undated) DEVICE — KIT  I.V. START (100EA/CA)

## (undated) DEVICE — KIT RADIAL ARTERY 20GA W/MAX BARRIER AND BIOPATCH  (5EA/CA) #10740 IS FOR THE SET RADIAL ARTERIAL

## (undated) DEVICE — SET LEADWIRE 5 LEAD BEDSIDE DISPOSABLE ECG (1SET OF 5/EA)

## (undated) DEVICE — DRAPE MAGNETIC (INSTRA-MAG) - (30/CA)

## (undated) DEVICE — Device

## (undated) DEVICE — SHEAR HS FOCUS 9CM CVD - (6/BX)

## (undated) DEVICE — SUCTION INSTRUMENT YANKAUER BULBOUS TIP W/O VENT (50EA/CA)

## (undated) DEVICE — SUTURE GENERAL

## (undated) DEVICE — DRESSING TRANSPARENT FILM TEGADERM 4 X 4.75" (50EA/BX)"

## (undated) DEVICE — LACTATED RINGERS INJ 1000 ML - (14EA/CA 60CA/PF)

## (undated) DEVICE — SUTURE 3-0 VICRYL PLUS SH - 27 INCH (36/BX)

## (undated) DEVICE — TUBING CLEARLINK DUO-VENT - C-FLO (48EA/CA)

## (undated) DEVICE — BOVIE NEEDLE TIP 3CM COLORADO

## (undated) DEVICE — CANISTER SUCTION RIGID RED 1500CC (40EA/CA)

## (undated) DEVICE — GLOVE BIOGEL SZ 8 SURGICAL PF LTX - (50PR/BX 4BX/CA)

## (undated) DEVICE — WATER IRRIGATION STERILE 1000ML (12EA/CA)

## (undated) DEVICE — SPONGE PEANUT - (5/PK 50PK/CA)

## (undated) DEVICE — GOWN SURGEONS LARGE - (32/CA)

## (undated) DEVICE — TUBE CONNECTING SUCTION - CLEAR PLASTIC STERILE 72 IN (50EA/CA)

## (undated) DEVICE — SODIUM CHL. INJ. 0.9% 500ML (24EA/CA 50CA/PF)

## (undated) DEVICE — SHEET THYROID - (10EA/CA)

## (undated) DEVICE — TOWEL STOP TIMEOUT SAFETY FLAG (40EA/CA)

## (undated) DEVICE — FIBRILLAR SURGICEL 4X4 - 10/CA

## (undated) DEVICE — GLOVE BIOGEL INDICATOR SZ 7SURGICAL PF LTX - (50/BX 4BX/CA)

## (undated) DEVICE — CANISTER SUCTION 3000ML MECHANICAL FILTER AUTO SHUTOFF MEDI-VAC NONSTERILE LF DISP  (40EA/CA)

## (undated) DEVICE — CANNULA O2 COMFORT SOFT EAR ADULT 7 FT TUBING (50/CA)

## (undated) DEVICE — SENSOR OXIMETER ADULT SPO2 RD SET (20EA/BX)

## (undated) DEVICE — SUTURE 5-0 MONOCRYL PLUS PC-3 - (12/BX)

## (undated) DEVICE — GOWN WARMING STANDARD FLEX - (30/CA)

## (undated) DEVICE — SODIUM CHL IRRIGATION 0.9% 1000ML (12EA/CA)